# Patient Record
Sex: FEMALE | Race: WHITE | NOT HISPANIC OR LATINO | URBAN - NONMETROPOLITAN AREA
[De-identification: names, ages, dates, MRNs, and addresses within clinical notes are randomized per-mention and may not be internally consistent; named-entity substitution may affect disease eponyms.]

---

## 2025-05-16 ENCOUNTER — OFFICE VISIT (OUTPATIENT)
Dept: PRIMARY CARE | Facility: CLINIC | Age: 30
End: 2025-05-16
Payer: COMMERCIAL

## 2025-05-16 VITALS
HEIGHT: 68 IN | HEART RATE: 63 BPM | SYSTOLIC BLOOD PRESSURE: 120 MMHG | WEIGHT: 174.4 LBS | BODY MASS INDEX: 26.43 KG/M2 | DIASTOLIC BLOOD PRESSURE: 80 MMHG

## 2025-05-16 DIAGNOSIS — Z76.89 ESTABLISHING CARE WITH NEW DOCTOR, ENCOUNTER FOR: ICD-10-CM

## 2025-05-16 DIAGNOSIS — D50.9 IRON DEFICIENCY ANEMIA, UNSPECIFIED IRON DEFICIENCY ANEMIA TYPE: ICD-10-CM

## 2025-05-16 DIAGNOSIS — Z13.29 THYROID DISORDER SCREENING: ICD-10-CM

## 2025-05-16 DIAGNOSIS — G47.10 HYPERSOMNOLENCE: Primary | ICD-10-CM

## 2025-05-16 DIAGNOSIS — Z13.1 DIABETES MELLITUS SCREENING: ICD-10-CM

## 2025-05-16 DIAGNOSIS — Z13.220 LIPID SCREENING: ICD-10-CM

## 2025-05-16 PROCEDURE — 3008F BODY MASS INDEX DOCD: CPT | Performed by: NURSE PRACTITIONER

## 2025-05-16 PROCEDURE — 99204 OFFICE O/P NEW MOD 45 MIN: CPT | Performed by: NURSE PRACTITIONER

## 2025-05-16 PROCEDURE — 1036F TOBACCO NON-USER: CPT | Performed by: NURSE PRACTITIONER

## 2025-05-16 RX ORDER — FERROUS SULFATE 325(65) MG
1 TABLET, DELAYED RELEASE (ENTERIC COATED) ORAL DAILY
COMMUNITY

## 2025-05-16 ASSESSMENT — ENCOUNTER SYMPTOMS
DIFFICULTY URINATING: 0
SORE THROAT: 0
WHEEZING: 0
ABDOMINAL DISTENTION: 0
BLOOD IN STOOL: 0
WOUND: 0
CONSTIPATION: 0
SHORTNESS OF BREATH: 0
HEMATURIA: 0
NUMBNESS: 0
NERVOUS/ANXIOUS: 0
COUGH: 0
HEADACHES: 0
PHOTOPHOBIA: 0
CHEST TIGHTNESS: 0
NECK PAIN: 0
DIZZINESS: 0
NAUSEA: 0
FEVER: 0
SPEECH DIFFICULTY: 0
ARTHRALGIAS: 0
BACK PAIN: 0
JOINT SWELLING: 0
PALPITATIONS: 0
DYSURIA: 0
FATIGUE: 1
SEIZURES: 0
MYALGIAS: 0
CHOKING: 0
FREQUENCY: 0
DIARRHEA: 0
CONFUSION: 0
FACIAL ASYMMETRY: 0
ABDOMINAL PAIN: 0
APNEA: 0
EYE REDNESS: 0
CHILLS: 0
VOMITING: 0
SLEEP DISTURBANCE: 0
TROUBLE SWALLOWING: 0
FLANK PAIN: 0
UNEXPECTED WEIGHT CHANGE: 0
WEAKNESS: 0
EYE PAIN: 0

## 2025-05-16 ASSESSMENT — PATIENT HEALTH QUESTIONNAIRE - PHQ9
2. FEELING DOWN, DEPRESSED OR HOPELESS: NOT AT ALL
SUM OF ALL RESPONSES TO PHQ9 QUESTIONS 1 AND 2: 0
1. LITTLE INTEREST OR PLEASURE IN DOING THINGS: NOT AT ALL

## 2025-05-16 NOTE — PROGRESS NOTES
"Subjective   Patient ID: Kay Martines is a 29 y.o. female who presents for Establish Care (NEW PATIENT, INSOMNIA, WEIGHT ISSUES).      HPI:  Presents today TO [facility].  C/O TROUBLE FALLING SLEEPING X 4 YEARS  modifying factors consists of NONE  associated symptoms consist of EXCESSIVE DAYTIME FATIGUE. NO KNOWN APNEA.   prior treatment consists of medication NONE    IRON DEF- NO LONGER TAKING IRON. WILL CHECK LEVELS     Visit Vitals  /80   Pulse 63   Ht 1.727 m (5' 8\")   Wt 79.1 kg (174 lb 6.4 oz)   BMI 26.52 kg/m²   Smoking Status Never   BSA 1.95 m²        Review of Systems   Constitutional:  Positive for fatigue. Negative for chills, fever and unexpected weight change.   HENT:  Negative for congestion, ear pain, sore throat and trouble swallowing.    Eyes:  Negative for photophobia, pain, redness and visual disturbance.   Respiratory:  Negative for apnea, cough, choking, chest tightness, shortness of breath and wheezing.    Cardiovascular:  Negative for chest pain, palpitations and leg swelling.   Gastrointestinal:  Negative for abdominal distention, abdominal pain, blood in stool, constipation, diarrhea, nausea and vomiting.   Genitourinary:  Negative for difficulty urinating, dysuria, flank pain, frequency, hematuria and urgency.   Musculoskeletal:  Negative for arthralgias, back pain, gait problem, joint swelling, myalgias and neck pain.   Skin:  Negative for rash and wound.   Neurological:  Negative for dizziness, seizures, syncope, facial asymmetry, speech difficulty, weakness, numbness and headaches.   Psychiatric/Behavioral:  Negative for confusion, sleep disturbance and suicidal ideas. The patient is not nervous/anxious.        Objective     Physical Exam  Constitutional:       Appearance: Normal appearance. She is normal weight.   HENT:      Head: Normocephalic.   Eyes:      Extraocular Movements: Extraocular movements intact.      Conjunctiva/sclera: Conjunctivae normal.      " Pupils: Pupils are equal, round, and reactive to light.   Cardiovascular:      Rate and Rhythm: Normal rate and regular rhythm.      Pulses: Normal pulses.      Heart sounds: Normal heart sounds.   Pulmonary:      Effort: Pulmonary effort is normal.      Breath sounds: Normal breath sounds.   Musculoskeletal:         General: Normal range of motion.      Cervical back: Normal range of motion.   Skin:     General: Skin is warm and dry.   Neurological:      General: No focal deficit present.      Mental Status: She is alert and oriented to person, place, and time.   Psychiatric:         Mood and Affect: Mood normal.         Behavior: Behavior normal.         Thought Content: Thought content normal.         Judgment: Judgment normal.            Assessment/Plan   Problem List Items Addressed This Visit       Hypersomnolence - Primary    Relevant Orders    Home sleep apnea test (HSAT)    Lipid screening    Relevant Orders    Lipid Panel    Diabetes mellitus screening    Relevant Orders    Hemoglobin A1C    Iron deficiency anemia    Relevant Orders    Ferritin    Folate    Iron and TIBC    Vitamin B12    Comprehensive Metabolic Panel    CBC and Auto Differential    Thyroid disorder screening    Relevant Orders    TSH with reflex to Free T4 if abnormal     Other Visit Diagnoses         Establishing care with new doctor, encounter for            I WILL ORDER HSS TO ASSESS FOR DANIA. I WILL ORDER HSS SINCE SHE HAS 4 SMALL CHILDREN AT HOME AND IT WOULD BE DIFFICULT TO DO AN IN LAB STUDY      WE DISCUSSED MOST COMMON SIDE EFFECTS OF PRESCRIBED MEDICATIONS. INDICATIONS, RISK, COMPLICATIONS, AND ALTERNATIVES OF MEDICATION/THERAPEUTICS WERE EXPLAINED AND DISCUSSED. PLEASE MONITOR CLOSELY FOR ANY UNTOWARD SIDE EFFECTS OR COMPLICATIONS OF MEDICATIONS. PATIENT IS STRONGLY ADVISED TO BE COMPLIANT WITH RECOMMENDATIONS. QUESTIONS AND CONCERNS WERE ADDRESSED. INSTRUCTED TO CALL, RETURN SOONER, OR GO TO THE ER,  IF SYMPTOMS PERSIST OR  WORSEN. THEY VOICED UNDERSTANDING AND  DENIES FURTHER QUESTIONS AT THIS TIME.    TIME CODE  1. PREPARATION FOR PATIENT'S VISIT (REVIEWING CHART, CURRENT MEDICAL RECORDS, OUTSIDE HEALTH PROVIDER RECORDS, PREVIOUS HISTORY, EXAM, TEST, PROCEDURE, AND MEDICATIONS)  2. FACE TO FACE ENCOUNTER OBTAINING HISTORY FROM THE PATIENT/FAMILY/CAREGIVERS; PERFORMING EVALUATION AND EXAMINATION; ORDERING TESTS OR PROCEDURES; REFERRING AND COMMUNICATING WITH OTHER HEALTHCARE PROVIDERS; COUNSELING AND EDUCATION OF THE PATIENT/FAMILY/CAREGIVERS; INDEPENDENTLY INTERPRETING RESULTS (TESTS, LABS, PROCEDURES, IMAGING) AND COMMUNICATING AND EXPLAINING RESULTS TO THE PATIENT/FAMILY/CAREGIVERS  3. COORDINATION OF CARE; PREPARING AND PRINTING DISCHARGE INSTRUCTIONS AND ANY EDUCATIONAL MATERIAL FOR THE PATIENT/FAMILY/CAREGIVERS. DOCUMENTING CLINICAL INFORMATION IN THE ELECTRONIC MEDICAL RECORD   4. REVIEWING OARRS AS NEEDED    MDM  1) COMPLEXITY: MORE THAN 1 STABLE CHRONIC CONDITION ADDRESSED OR 1 ACUTE ILLNESS ADDRESSED   2)DATA: TESTS INTERPRETED AND OR ORDERED, TOOK INDEPENDENT HISTORY OR RECORDS REVIEWED  3)RISK: MODERATE RISK DUE TO NATURE OF MEDICAL CONDITIONS/COMORBIDITY OR MEDICATIONS ORDERED OR SURGICAL OR PROCEDURE REFERRAL      AFTER TESTING

## 2025-05-21 ENCOUNTER — APPOINTMENT (OUTPATIENT)
Dept: SLEEP MEDICINE | Facility: HOSPITAL | Age: 30
End: 2025-05-21
Payer: COMMERCIAL

## 2025-05-21 ENCOUNTER — CLINICAL SUPPORT (OUTPATIENT)
Dept: SLEEP MEDICINE | Facility: HOSPITAL | Age: 30
End: 2025-05-21
Payer: COMMERCIAL

## 2025-05-21 VITALS — WEIGHT: 174 LBS | HEIGHT: 66 IN | BODY MASS INDEX: 27.97 KG/M2

## 2025-05-21 DIAGNOSIS — G47.10 HYPERSOMNOLENCE: ICD-10-CM

## 2025-05-21 PROCEDURE — 9420000001 HC RT PATIENT EDUCATION 5 MIN

## 2025-05-21 NOTE — PROGRESS NOTES
Type of Study: HOME SLEEP STUDY - NOMAD     The patient received equipment and instructions for use of the [employer] Nomad HSAT device. The patient was instructed how to apply the effort belts, cannula, thermistor. It was also explained how the Nomad and oximeter components work.  The patient was asked to record their sleep for an 8-hour period.    The patient was informed of their responsibility for the device and acknowledged this by signing the HSAT device contract. The patient was asked to return the device on the next day and was shown where the drop off box was located in the hospital.  The patient was also given written instructions and troubleshooting guide for the HSAT device.    After the procedure, the patient/family was informed to follow up with ordering clinician for testing results.

## 2025-05-22 LAB
ALBUMIN SERPL-MCNC: 4.8 G/DL (ref 3.6–5.1)
ALP SERPL-CCNC: 44 U/L (ref 31–125)
ALT SERPL-CCNC: 13 U/L (ref 6–29)
ANION GAP SERPL CALCULATED.4IONS-SCNC: 8 MMOL/L (CALC) (ref 7–17)
BASOPHILS # BLD AUTO: 32 CELLS/UL (ref 0–200)
BASOPHILS NFR BLD AUTO: 0.7 %
BUN SERPL-MCNC: 11 MG/DL (ref 7–25)
CALCIUM SERPL-MCNC: 9.2 MG/DL (ref 8.6–10.2)
CHLORIDE SERPL-SCNC: 105 MMOL/L (ref 98–110)
CHOLEST SERPL-MCNC: 214 MG/DL
CHOLEST/HDLC SERPL: 4.4 (CALC)
CO2 SERPL-SCNC: 24 MMOL/L (ref 20–32)
CREAT SERPL-MCNC: 0.6 MG/DL (ref 0.5–0.96)
EGFRCR SERPLBLD CKD-EPI 2021: 125 ML/MIN/1.73M2
EOSINOPHIL # BLD AUTO: 108 CELLS/UL (ref 15–500)
EOSINOPHIL NFR BLD AUTO: 2.4 %
ERYTHROCYTE [DISTWIDTH] IN BLOOD BY AUTOMATED COUNT: 14.5 % (ref 11–15)
EST. AVERAGE GLUCOSE BLD GHB EST-MCNC: 111 MG/DL
EST. AVERAGE GLUCOSE BLD GHB EST-SCNC: 6.2 MMOL/L
FERRITIN SERPL-MCNC: 8 NG/ML (ref 16–154)
FOLATE SERPL-MCNC: 14 NG/ML
GLUCOSE SERPL-MCNC: 91 MG/DL (ref 65–99)
HBA1C MFR BLD: 5.5 %
HCT VFR BLD AUTO: 40.4 % (ref 35–45)
HDLC SERPL-MCNC: 49 MG/DL
HGB BLD-MCNC: 12.7 G/DL (ref 11.7–15.5)
IRON SERPL-MCNC: NORMAL MCG/DL
LDLC SERPL CALC-MCNC: 148 MG/DL (CALC)
LYMPHOCYTES # BLD AUTO: 1562 CELLS/UL (ref 850–3900)
LYMPHOCYTES NFR BLD AUTO: 34.7 %
MCH RBC QN AUTO: 26 PG (ref 27–33)
MCHC RBC AUTO-ENTMCNC: 31.4 G/DL (ref 32–36)
MCV RBC AUTO: 82.6 FL (ref 80–100)
MONOCYTES # BLD AUTO: 297 CELLS/UL (ref 200–950)
MONOCYTES NFR BLD AUTO: 6.6 %
NEUTROPHILS # BLD AUTO: 2502 CELLS/UL (ref 1500–7800)
NEUTROPHILS NFR BLD AUTO: 55.6 %
NONHDLC SERPL-MCNC: 165 MG/DL (CALC)
PLATELET # BLD AUTO: 277 THOUSAND/UL (ref 140–400)
PMV BLD REES-ECKER: 10.4 FL (ref 7.5–12.5)
POTASSIUM SERPL-SCNC: NORMAL MMOL/L
PROT SERPL-MCNC: 7.6 G/DL (ref 6.1–8.1)
RBC # BLD AUTO: 4.89 MILLION/UL (ref 3.8–5.1)
SODIUM SERPL-SCNC: 137 MMOL/L (ref 135–146)
TRIGL SERPL-MCNC: 71 MG/DL
TSH SERPL-ACNC: 0.51 MIU/L
VIT B12 SERPL-MCNC: 301 PG/ML (ref 200–1100)
WBC # BLD AUTO: 4.5 THOUSAND/UL (ref 3.8–10.8)

## 2025-05-29 ENCOUNTER — TELEPHONE (OUTPATIENT)
Dept: PRIMARY CARE | Facility: CLINIC | Age: 30
End: 2025-05-29
Payer: COMMERCIAL

## 2025-05-29 NOTE — TELEPHONE ENCOUNTER
----- Message from Daly Garza sent at 5/29/2025 11:40 AM EDT -----  Please set up appt to discuss labs and HSS  ----- Message -----  From: Brandon Hernandez - Lab Results In  Sent: 5/27/2025   1:46 PM EDT  To: SANDY Montalvo-CNP

## 2025-06-06 ENCOUNTER — APPOINTMENT (OUTPATIENT)
Dept: PRIMARY CARE | Facility: CLINIC | Age: 30
End: 2025-06-06
Payer: COMMERCIAL

## 2025-06-20 ENCOUNTER — APPOINTMENT (OUTPATIENT)
Dept: PRIMARY CARE | Facility: CLINIC | Age: 30
End: 2025-06-20
Payer: COMMERCIAL

## 2025-07-09 ENCOUNTER — APPOINTMENT (OUTPATIENT)
Dept: PRIMARY CARE | Facility: CLINIC | Age: 30
End: 2025-07-09
Payer: COMMERCIAL

## 2025-07-09 VITALS
BODY MASS INDEX: 27.64 KG/M2 | SYSTOLIC BLOOD PRESSURE: 119 MMHG | HEIGHT: 66 IN | WEIGHT: 172 LBS | DIASTOLIC BLOOD PRESSURE: 77 MMHG | HEART RATE: 74 BPM

## 2025-07-09 DIAGNOSIS — E78.2 MIXED HYPERLIPIDEMIA: ICD-10-CM

## 2025-07-09 DIAGNOSIS — D50.9 IRON DEFICIENCY ANEMIA, UNSPECIFIED IRON DEFICIENCY ANEMIA TYPE: Primary | ICD-10-CM

## 2025-07-09 DIAGNOSIS — E53.8 B12 DEFICIENCY: ICD-10-CM

## 2025-07-09 PROCEDURE — 99214 OFFICE O/P EST MOD 30 MIN: CPT | Performed by: NURSE PRACTITIONER

## 2025-07-09 PROCEDURE — 3008F BODY MASS INDEX DOCD: CPT | Performed by: NURSE PRACTITIONER

## 2025-07-09 PROCEDURE — 1036F TOBACCO NON-USER: CPT | Performed by: NURSE PRACTITIONER

## 2025-07-09 RX ORDER — FERROUS SULFATE 325(65) MG
1 TABLET, DELAYED RELEASE (ENTERIC COATED) ORAL DAILY
Qty: 90 TABLET | Refills: 3 | Status: SHIPPED | OUTPATIENT
Start: 2025-07-09

## 2025-07-09 RX ORDER — LANOLIN ALCOHOL/MO/W.PET/CERES
1000 CREAM (GRAM) TOPICAL DAILY
Qty: 90 TABLET | Refills: 3 | Status: SHIPPED | OUTPATIENT
Start: 2025-07-09 | End: 2026-07-09

## 2025-07-09 ASSESSMENT — ENCOUNTER SYMPTOMS
BACK PAIN: 0
CONSTIPATION: 0
PHOTOPHOBIA: 0
NECK PAIN: 0
ABDOMINAL DISTENTION: 0
FATIGUE: 1
SEIZURES: 0
SHORTNESS OF BREATH: 0
CHEST TIGHTNESS: 0
BLOOD IN STOOL: 0
WHEEZING: 0
NUMBNESS: 0
FREQUENCY: 0
NERVOUS/ANXIOUS: 0
WOUND: 0
DIFFICULTY URINATING: 0
DIZZINESS: 0
FLANK PAIN: 0
DIARRHEA: 0
TROUBLE SWALLOWING: 0
ARTHRALGIAS: 0
PALPITATIONS: 0
HEADACHES: 0
EYE REDNESS: 0
FACIAL ASYMMETRY: 0
NAUSEA: 0
ABDOMINAL PAIN: 0
CHOKING: 0
COUGH: 0
DYSURIA: 0
APNEA: 0
HEMATURIA: 0
SPEECH DIFFICULTY: 0
SLEEP DISTURBANCE: 0
WEAKNESS: 0
SORE THROAT: 0
MYALGIAS: 0
VOMITING: 0
EYE PAIN: 0
CHILLS: 0
CONFUSION: 0
JOINT SWELLING: 0
FEVER: 0
UNEXPECTED WEIGHT CHANGE: 0

## 2025-07-09 NOTE — PROGRESS NOTES
"Subjective   Patient ID: Kay Martines is a 29 y.o. female who presents for Follow-up (DISCUSS LABS, AND HSS).    HPI:  Presents today for LABS AND HSS FU. NO NEW COMPLAINTS      IRON- FERRITIN LOW, IRON TNP. START DAILY IRON. RECHECK IN 3 MONTHS  B12- LOW. START DAILY PO SUPPLEMENT. RECHECK IN 3 MONTHS   LIPIDS-  DIET MODIFICATIONS DISCUSSED    Visit Vitals  /77   Pulse 74   Ht 1.676 m (5' 6\")   Wt 78 kg (172 lb)   BMI 27.76 kg/m²   OB Status Having periods   Smoking Status Never   BSA 1.91 m²        Review of Systems   Constitutional:  Positive for fatigue. Negative for chills, fever and unexpected weight change.   HENT:  Negative for congestion, ear pain, sore throat and trouble swallowing.    Eyes:  Negative for photophobia, pain, redness and visual disturbance.   Respiratory:  Negative for apnea, cough, choking, chest tightness, shortness of breath and wheezing.    Cardiovascular:  Negative for chest pain, palpitations and leg swelling.   Gastrointestinal:  Negative for abdominal distention, abdominal pain, blood in stool, constipation, diarrhea, nausea and vomiting.   Genitourinary:  Negative for difficulty urinating, dysuria, flank pain, frequency, hematuria and urgency.   Musculoskeletal:  Negative for arthralgias, back pain, gait problem, joint swelling, myalgias and neck pain.   Skin:  Negative for rash and wound.   Neurological:  Negative for dizziness, seizures, syncope, facial asymmetry, speech difficulty, weakness, numbness and headaches.   Psychiatric/Behavioral:  Negative for confusion, sleep disturbance and suicidal ideas. The patient is not nervous/anxious.        Objective   Component      Latest Ref Rng 5/21/2025   WHITE BLOOD CELL COUNT      3.8 - 10.8 Thousand/uL 4.5    RED BLOOD CELL COUNT      3.80 - 5.10 Million/uL 4.89    HEMOGLOBIN      11.7 - 15.5 g/dL 12.7    HEMATOCRIT      35.0 - 45.0 % 40.4    MCV      80.0 - 100.0 fL 82.6    MCH      27.0 - 33.0 pg 26.0 (L)    MCHC      " 32.0 - 36.0 g/dL 31.4 (L)    RDW      11.0 - 15.0 % 14.5    PLATELET COUNT      140 - 400 Thousand/uL 277    MPV      7.5 - 12.5 fL 10.4    ABSOLUTE NEUTROPHILS      1,500 - 7,800 cells/uL 2,502    ABSOLUTE LYMPHOCYTES      850 - 3,900 cells/uL 1,562    ABSOLUTE MONOCYTES      200 - 950 cells/uL 297    ABSOLUTE EOSINOPHILS      15 - 500 cells/uL 108    ABSOLUTE BASOPHILS      0 - 200 cells/uL 32    NEUTROPHILS      % 55.6    LYMPHOCYTES      % 34.7    MONOCYTES      % 6.6    EOSINOPHILS      % 2.4    BASOPHILS      % 0.7    GLUCOSE      65 - 99 mg/dL 91    UREA NITROGEN (BUN)      7 - 25 mg/dL 11    CREATININE      0.50 - 0.96 mg/dL 0.60    EGFR      > OR = 60 mL/min/1.73m2 125    SODIUM      135 - 146 mmol/L 137    CHLORIDE      98 - 110 mmol/L 105    CARBON DIOXIDE      20 - 32 mmol/L 24    ELECTROLYTE BALANCE      7 - 17 mmol/L (calc) 8    CALCIUM      8.6 - 10.2 mg/dL 9.2    PROTEIN, TOTAL      6.1 - 8.1 g/dL 7.6    ALBUMIN      3.6 - 5.1 g/dL 4.8    ALKALINE PHOSPHATASE      31 - 125 U/L 44    ALT      6 - 29 U/L 13    POTASSIUM      mmol/L TNP    CHOLESTEROL, TOTAL      <200 mg/dL 214 (H)    HDL CHOLESTEROL      > OR = 50 mg/dL 49 (L)    TRIGLYCERIDES      <150 mg/dL 71    LDL-CHOLESTEROL      mg/dL (calc) 148 (H)    CHOL/HDLC RATIO      <5.0 (calc) 4.4    NON HDL CHOLESTEROL      <130 mg/dL (calc) 165 (H)    HEMOGLOBIN A1c      <5.7 % 5.5    eAG (mg/dL)      mg/dL 111    eAG (mmol/L)      mmol/L 6.2    FERRITIN      16 - 154 ng/mL 8 (L)    FOLATE, SERUM      ng/mL 14.0    IRON, TOTAL      mcg/dL TNP    VITAMIN B12      200 - 1,100 pg/mL 301    TSH      mIU/L 0.51       Legend:  (L) Low  (H) High    SEE HSS- NEGATIVE FOR DANIA    Physical Exam  Constitutional:       Appearance: Normal appearance. She is normal weight.   HENT:      Head: Normocephalic.   Eyes:      Extraocular Movements: Extraocular movements intact.      Conjunctiva/sclera: Conjunctivae normal.      Pupils: Pupils are equal, round, and reactive  to light.   Cardiovascular:      Rate and Rhythm: Normal rate and regular rhythm.      Pulses: Normal pulses.      Heart sounds: Normal heart sounds.   Pulmonary:      Effort: Pulmonary effort is normal.      Breath sounds: Normal breath sounds.   Musculoskeletal:         General: Normal range of motion.      Cervical back: Normal range of motion.   Skin:     General: Skin is warm and dry.   Neurological:      General: No focal deficit present.      Mental Status: She is alert and oriented to person, place, and time.   Psychiatric:         Mood and Affect: Mood normal.         Behavior: Behavior normal.         Thought Content: Thought content normal.         Judgment: Judgment normal.            Assessment/Plan   Problem List Items Addressed This Visit       Iron deficiency anemia - Primary    Relevant Medications    ferrous sulfate 325 (65 Fe) mg EC tablet    Other Relevant Orders    Comprehensive Metabolic Panel    CBC and Auto Differential    Iron and TIBC    Ferritin    B12 deficiency    Relevant Medications    cyanocobalamin (Vitamin B-12) 1,000 mcg tablet    Other Relevant Orders    Vitamin B12    Mixed hyperlipidemia    Relevant Orders    TSH with reflex to Free T4 if abnormal   WE DISCUSSED MOST COMMON SIDE EFFECTS OF PRESCRIBED MEDICATIONS. INDICATIONS, RISK, COMPLICATIONS, AND ALTERNATIVES OF MEDICATION/THERAPEUTICS WERE EXPLAINED AND DISCUSSED. PLEASE MONITOR CLOSELY FOR ANY UNTOWARD SIDE EFFECTS OR COMPLICATIONS OF MEDICATIONS. PATIENT IS STRONGLY ADVISED TO BE COMPLIANT WITH RECOMMENDATIONS. QUESTIONS AND CONCERNS WERE ADDRESSED. INSTRUCTED TO CALL, RETURN SOONER, OR GO TO THE ER,  IF SYMPTOMS PERSIST OR WORSEN. THEY VOICED UNDERSTANDING AND  DENIES FURTHER QUESTIONS AT THIS TIME.    TIME CODE  1. PREPARATION FOR PATIENT'S VISIT (REVIEWING CHART, CURRENT MEDICAL RECORDS, OUTSIDE HEALTH PROVIDER RECORDS, PREVIOUS HISTORY, EXAM, TEST, PROCEDURE, AND MEDICATIONS)  2. FACE TO FACE ENCOUNTER OBTAINING  HISTORY FROM THE PATIENT/FAMILY/CAREGIVERS; PERFORMING EVALUATION AND EXAMINATION; ORDERING TESTS OR PROCEDURES; REFERRING AND COMMUNICATING WITH OTHER HEALTHCARE PROVIDERS; COUNSELING AND EDUCATION OF THE PATIENT/FAMILY/CAREGIVERS; INDEPENDENTLY INTERPRETING RESULTS (TESTS, LABS, PROCEDURES, IMAGING) AND COMMUNICATING AND EXPLAINING RESULTS TO THE PATIENT/FAMILY/CAREGIVERS  3. COORDINATION OF CARE; PREPARING AND PRINTING DISCHARGE INSTRUCTIONS AND ANY EDUCATIONAL MATERIAL FOR THE PATIENT/FAMILY/CAREGIVERS. DOCUMENTING CLINICAL INFORMATION IN THE ELECTRONIC MEDICAL RECORD   4. REVIEWING OARRS AS NEEDED    MDM  1) COMPLEXITY: MORE THAN 1 STABLE CHRONIC CONDITION ADDRESSED OR 1 ACUTE ILLNESS ADDRESSED   2)DATA: TESTS INTERPRETED AND OR ORDERED, TOOK INDEPENDENT HISTORY OR RECORDS REVIEWED  3)RISK: MODERATE RISK DUE TO NATURE OF MEDICAL CONDITIONS/COMORBIDITY OR MEDICATIONS ORDERED OR SURGICAL OR PROCEDURE REFERRAL    3 MONTHS WITH LABS

## 2025-07-31 ENCOUNTER — TELEPHONE (OUTPATIENT)
Dept: PRIMARY CARE | Facility: CLINIC | Age: 30
End: 2025-07-31
Payer: COMMERCIAL

## 2025-07-31 NOTE — TELEPHONE ENCOUNTER
DX NOT COVERED FOR FOLATE AND VIT B12   USED WERE  IRON DEF ANEMIA  ENC FOR SCREEN FOR DM  ENC SCREEN FOR LIPOID  ENC SCREEN OTH SUSP ENDO D/0    IS THERE ANOTHER ON WE COULD ADD?

## 2025-10-09 ENCOUNTER — APPOINTMENT (OUTPATIENT)
Dept: PRIMARY CARE | Facility: CLINIC | Age: 30
End: 2025-10-09
Payer: COMMERCIAL